# Patient Record
Sex: FEMALE
[De-identification: names, ages, dates, MRNs, and addresses within clinical notes are randomized per-mention and may not be internally consistent; named-entity substitution may affect disease eponyms.]

---

## 2023-03-19 ENCOUNTER — NON-APPOINTMENT (OUTPATIENT)
Age: 33
End: 2023-03-19

## 2023-07-19 PROBLEM — Z00.00 ENCOUNTER FOR PREVENTIVE HEALTH EXAMINATION: Status: ACTIVE | Noted: 2023-07-19

## 2023-07-25 ENCOUNTER — APPOINTMENT (OUTPATIENT)
Dept: HEMATOLOGY ONCOLOGY | Facility: CLINIC | Age: 33
End: 2023-07-25

## 2023-08-01 ENCOUNTER — NON-APPOINTMENT (OUTPATIENT)
Age: 33
End: 2023-08-01

## 2023-08-01 NOTE — DISCUSSION/SUMMARY
[FreeTextEntry1] : REASON FOR VISIT: Ms. Marce Jean is a 32-year-old female who was called on 2023 for a discussion regarding her negative genetic test results related to hereditary cancer predisposition. The patient did not answer her phone and a voicemail was unable to be left for her.   RELEVANT MEDICAL AND SURGICAL HISTORY: The patient reported no personal history of cancer.  OTHER MEDICAL AND SURGICAL HISTORY: - HTN - Overweight - Possible PCOS or non-classic CAH. She reported being seen by an endocrinologist and was told she carries CAH. She also reported her sister was born with pubic hair. Father was negative as a carrier and mom and sister carriers of CAH. We discussed that carriers of CAH generally present without symptoms as it is an autosomal recessive condition. She was offered for us to review these medical records if she wished.  PAST OB/GYN HISTORY: Obstetrical History:  Age at Menarche: 12 Pre-Menopausal: irregular menses (more regular when exercising) Age at First Live Birth: None Oral Contraceptive Use: None Hormone Replacement Therapy: None  CANCER SCREENING HISTORY: Breast: None GYN: Last visit 4-5 months ago. GYN is JASON Avendano) Frequency: annual. Patient reported possible PCOS. Colon: None Skin: Mole removed at the age 7-8 per patient it was benign.  SOCIAL HISTORY: - Tobacco-product use: None  FAMILY HISTORY: Paternal ancestry was reported as English/Upper sorbian and maternal ancestry was reported as [place]anese. A detailed family history of cancer was ascertained, see below for pedigree. Of note: - Father with metastatic prostate cancer, negative genetic testing - Maternal aunt with ovarian cancer in her 50s, aunt stated it was cancerous and also said it had "low malignant potential," no genetics to her knowledge - Maternal grandmother with breast cancer at age 65  The remaining family history is unremarkable. According to the patient there are no other known cases of significant cancers in the family. To her knowledge no one else in the family has had germline testing for cancer susceptibility.   TEST RESULTS: NEGATIVE   NO pathogenic (disease-causing) variants or variants of uncertain significance were detected in the following genes: JUAN, BARD1, BRCA1, BRCA2, BRIP1, CDH1, CHEK2, EPCAM, MLH1, MSH2, MSH6, NF1, PALB2, PMS2, PTEN, RAD51C, RAD51D, STK11, TP53   RESULTS INTERPRETATION AND ASSESSMENT: Given Ms. Frausto current reported family history of cancer, and her negative genetic test results, the following screening guidelines and risk-reducing recommendations were discussed:  Breast: Recommend reassessment for breast screening recommendation closer to the patient's late 30's.  Other: In the absence of other indications, the patient should practice age-appropriate cancer screening for all other organ systems as recommended for the general population.   It is recommended that the patient discuss the importance of pursuing cancer genetic testing and counseling with her other relatives. There may be a pathogenic variant in the family which the patient did not inherit. We would be happy to meet with her family members or refer them to a genetic expert in their area.   We also discussed the limitations of negative results: 1. The cause of the patients history of cancer remains unknown. The cancer may have developed randomly, or due to environmental factors.  2. This negative result does not completely rule out a hereditary basis for the reported history due to limitations in technology or a variant being present in an unidentified gene. 3. Variants in other genes would not be identified by this analysis, so this negative result does not rule out the possibility of having a mutation in a different hereditary cancer gene or the possibility of ever developing cancer. 4. It is possible there is a hereditary cancer predisposition gene mutation in the family, but the patient did not inherit it.   Our knowledge of genetics and inherited cancer conditions is changing rapidly, therefore, we recommend that the patient contact our office, every 2 to 3 years, to discuss relevant advances in cancer genetics. We emphasize the importance of re-contacting us with updates regarding her personal and family history of cancer as well as any updates regarding additional cancer genetic test results performed for the patient and/or family members.  Such updates could possibly change our risk assessment and recommendations.   PLAN: 1. Long-term management and surveillance should be based on the patients personal and family history and general population guidelines for all other cancers. 2. A copy of the genetic test results were released to the patient. 3. The patient was encouraged to contact us every 2-3 years to discuss relevant advances in cancer genetics, or sooner if there are any changes in her personal or family history of cancer.   For any additional questions please call Cancer Genetics at [phone] or [phone].     Rakel Blue MS, [facility] [place] [facility], Cancer Genetics

## 2023-08-01 NOTE — DISCUSSION/SUMMARY
[FreeTextEntry1] : REASON FOR VISIT: Ms. Marce Jean is a 32-year-old female who was self-referred for cancer genetic counseling and risk assessment due to a family history of cancer. The patient was seen on 2023 through [facility]. The patient was unaccompanied.  RELEVANT MEDICAL AND SURGICAL HISTORY: The patient reported no personal history of cancer.   OTHER MEDICAL AND SURGICAL HISTORY: - HTN - Overweight - Possible PCOS or non-classic CAH. She reported being seen by an endocrinologist and was told she carries CAH. She also reported her sister was born with pubic hair. Father was negative as a carrier and mom and sister carriers of CAH. We discussed that carriers of CAH generally present without symptoms as it is an autosomal recessive condition. She was offered for us to review these medical records if she wished.  PAST OB/GYN HISTORY: Obstetrical History:  Age at Menarche: 12 Pre-Menopausal: irregular menses (more regular when exercising) Age at First Live Birth:  None Oral Contraceptive Use:  None Hormone Replacement Therapy: None  CANCER SCREENING HISTORY:  BREAST: None GYN: Last visit 4-5 months ago. GYN is JASON German ([place]) Frequency: annual. Patient reported possible PCOS. Colon: None Skin: Mole removed at the age 7-8 per patient it was benign.   SOCIAL HISTORY: - Tobacco-product use: None  FAMILY HISTORY: Paternal ancestry was reported as English/Irish and maternal ancestry was reported as [place]ian. A detailed family history of cancer was ascertained, see below for pedigree. Of note: - Father with metastatic prostate cancer, negative genetic testing - Maternal aunt with ovarian cancer in her 50s, aunt stated it was cancerous and also said it had "low malignant potential," no genetics to her knowledge - Maternal grandmother with breast cancer at age 65  The remaining family history is unremarkable. According to the patient there are no other known cases of significant cancers in the family. To her knowledge no one else in the family has had germline testing for cancer susceptibility.  	 RISK ASSESSMENT: Ms. Jean's family history of cancer may be suggestive of a hereditary cancer susceptibility syndrome. Variants in breast and gynecologic cancer susceptibility genes were of specific concern.   We discussed that ovarian tumors with low malignant potential do not have a high association with hereditary cancer predisposition syndromes to our knowledge. We also discussed since her father tested negative for hereditary cancer predisposition, there would likely not be a utility for testing her for hereditary cancer predisposition on his side of the family. The patient stated she would like to pursue testing regardless of these facts. 	  We recommended genetic testing for a breast/gyn cancer panel. This test analyzes 19 genes: UJAN, BARD1, BRCA1, BRCA2, BRIP1, CDH1, CHEK2, EPCAM, MLH1, MSH2, MSH6, NF1, PALB2, PMS2, PTEN, RAD51C, RAD51D, STK11, TP53   We discussed the risks, benefits and limitations, financial cost and implications of genetic testing. We also discussed the psychosocial implications of genetic testing. Possible test results were reviewed with the patient, along with associated medical management options. The Genetic Information Non-discrimination Act ([place]) was also reviewed.  The patient consented to the above-mentioned genetic testing panel. A sample was obtained from the patient in our clinic and will be sent to [facility] for analysis.  PLAN: 1. The patient's sample will be sent to [facility] for analysis.  2. We will contact the patient once the results are available. Results generally return in 2-3 weeks.  For any additional questions please call Cancer Genetics at [phone] or [phone].   Rakel Blue MS, [facility] [place] [facility] Counselor, Cancer Genetics

## 2024-02-04 ENCOUNTER — NON-APPOINTMENT (OUTPATIENT)
Age: 34
End: 2024-02-04

## 2024-10-27 ENCOUNTER — NON-APPOINTMENT (OUTPATIENT)
Age: 34
End: 2024-10-27

## 2025-05-03 ENCOUNTER — NON-APPOINTMENT (OUTPATIENT)
Age: 35
End: 2025-05-03